# Patient Record
Sex: FEMALE | Race: BLACK OR AFRICAN AMERICAN | NOT HISPANIC OR LATINO | Employment: OTHER | URBAN - METROPOLITAN AREA
[De-identification: names, ages, dates, MRNs, and addresses within clinical notes are randomized per-mention and may not be internally consistent; named-entity substitution may affect disease eponyms.]

---

## 2021-02-13 ENCOUNTER — TRANSCRIBE ORDERS (OUTPATIENT)
Dept: SURGERY | Facility: SURGERY CENTER | Age: 36
End: 2021-02-13

## 2021-02-13 DIAGNOSIS — Z01.818 OTHER SPECIFIED PRE-OPERATIVE EXAMINATION: Primary | ICD-10-CM

## 2021-02-25 ENCOUNTER — TRANSCRIBE ORDERS (OUTPATIENT)
Dept: LAB | Facility: SURGERY CENTER | Age: 36
End: 2021-02-25

## 2021-02-25 DIAGNOSIS — Z01.818 OTHER SPECIFIED PRE-OPERATIVE EXAMINATION: Primary | ICD-10-CM

## 2021-05-25 RX ORDER — DIPHENHYDRAMINE HCL 25 MG
25 CAPSULE ORAL NIGHTLY PRN
COMMUNITY

## 2021-05-25 RX ORDER — METOPROLOL SUCCINATE 25 MG/1
25 TABLET, EXTENDED RELEASE ORAL DAILY
COMMUNITY

## 2021-05-29 ENCOUNTER — LAB (OUTPATIENT)
Dept: LAB | Facility: SURGERY CENTER | Age: 36
End: 2021-05-29

## 2021-05-29 DIAGNOSIS — Z01.818 OTHER SPECIFIED PRE-OPERATIVE EXAMINATION: ICD-10-CM

## 2021-05-29 LAB — SARS-COV-2 ORF1AB RESP QL NAA+PROBE: NOT DETECTED

## 2021-05-29 PROCEDURE — C9803 HOPD COVID-19 SPEC COLLECT: HCPCS

## 2021-05-29 PROCEDURE — U0004 COV-19 TEST NON-CDC HGH THRU: HCPCS | Performed by: SURGERY

## 2021-06-01 ENCOUNTER — HOSPITAL ENCOUNTER (OUTPATIENT)
Facility: SURGERY CENTER | Age: 36
Setting detail: HOSPITAL OUTPATIENT SURGERY
Discharge: HOME | End: 2021-06-01
Attending: OTOLARYNGOLOGY | Admitting: OTOLARYNGOLOGY

## 2021-06-01 ENCOUNTER — ANESTHESIA (OUTPATIENT)
Dept: SURGERY | Facility: SURGERY CENTER | Age: 36
End: 2021-06-01

## 2021-06-01 ENCOUNTER — ANESTHESIA EVENT (OUTPATIENT)
Dept: SURGERY | Facility: SURGERY CENTER | Age: 36
End: 2021-06-01

## 2021-06-01 VITALS
WEIGHT: 176.4 LBS | TEMPERATURE: 97.3 F | HEART RATE: 78 BPM | RESPIRATION RATE: 16 BRPM | BODY MASS INDEX: 31.25 KG/M2 | OXYGEN SATURATION: 100 % | SYSTOLIC BLOOD PRESSURE: 119 MMHG | HEIGHT: 63 IN | DIASTOLIC BLOOD PRESSURE: 66 MMHG

## 2021-06-01 DIAGNOSIS — J35.01 CHRONIC TONSILLITIS: Primary | ICD-10-CM

## 2021-06-01 LAB — GLUCOSE BLDC GLUCOMTR-MCNC: 124 MG/DL (ref 70–130)

## 2021-06-01 PROCEDURE — 25010000002 MIDAZOLAM PER 1MG: Performed by: ANESTHESIOLOGY

## 2021-06-01 PROCEDURE — 0C5PXZZ DESTRUCTION OF TONSILS, EXTERNAL APPROACH: ICD-10-PCS | Performed by: OTOLARYNGOLOGY

## 2021-06-01 PROCEDURE — 82962 GLUCOSE BLOOD TEST: CPT | Performed by: OTOLARYNGOLOGY

## 2021-06-01 PROCEDURE — 88304 TISSUE EXAM BY PATHOLOGIST: CPT | Performed by: OTOLARYNGOLOGY

## 2021-06-01 PROCEDURE — 0C5QXZZ DESTRUCTION OF ADENOIDS, EXTERNAL APPROACH: ICD-10-PCS | Performed by: OTOLARYNGOLOGY

## 2021-06-01 PROCEDURE — 25010000002 FENTANYL CITRATE (PF) 250 MCG/5ML SOLUTION: Performed by: ANESTHESIOLOGY

## 2021-06-01 PROCEDURE — 25010000002 SUCCINYLCHOLINE PER 20 MG: Performed by: ANESTHESIOLOGY

## 2021-06-01 PROCEDURE — 25010000002 DEXAMETHASONE PER 1 MG: Performed by: ANESTHESIOLOGY

## 2021-06-01 PROCEDURE — 42821 REMOVE TONSILS AND ADENOIDS: CPT | Performed by: OTOLARYNGOLOGY

## 2021-06-01 PROCEDURE — 25010000002 ONDANSETRON PER 1 MG: Performed by: ANESTHESIOLOGY

## 2021-06-01 PROCEDURE — 25010000002 PROPOFOL 10 MG/ML EMULSION: Performed by: ANESTHESIOLOGY

## 2021-06-01 PROCEDURE — 25010000002 FENTANYL CITRATE (PF) 50 MCG/ML SOLUTION: Performed by: ANESTHESIOLOGY

## 2021-06-01 RX ORDER — MIDAZOLAM HYDROCHLORIDE 1 MG/ML
1 INJECTION INTRAMUSCULAR; INTRAVENOUS
Status: DISCONTINUED | OUTPATIENT
Start: 2021-06-01 | End: 2021-06-01 | Stop reason: HOSPADM

## 2021-06-01 RX ORDER — HYDROMORPHONE HYDROCHLORIDE 1 MG/ML
0.5 INJECTION, SOLUTION INTRAMUSCULAR; INTRAVENOUS; SUBCUTANEOUS
Status: DISCONTINUED | OUTPATIENT
Start: 2021-06-01 | End: 2021-06-01 | Stop reason: HOSPADM

## 2021-06-01 RX ORDER — SODIUM CHLORIDE, SODIUM LACTATE, POTASSIUM CHLORIDE, CALCIUM CHLORIDE 600; 310; 30; 20 MG/100ML; MG/100ML; MG/100ML; MG/100ML
INJECTION, SOLUTION INTRAVENOUS CONTINUOUS PRN
Status: DISCONTINUED | OUTPATIENT
Start: 2021-06-01 | End: 2021-06-01 | Stop reason: SURG

## 2021-06-01 RX ORDER — SUCCINYLCHOLINE CHLORIDE 20 MG/ML
INJECTION INTRAMUSCULAR; INTRAVENOUS AS NEEDED
Status: DISCONTINUED | OUTPATIENT
Start: 2021-06-01 | End: 2021-06-01 | Stop reason: SURG

## 2021-06-01 RX ORDER — PROPOFOL 10 MG/ML
VIAL (ML) INTRAVENOUS AS NEEDED
Status: DISCONTINUED | OUTPATIENT
Start: 2021-06-01 | End: 2021-06-01 | Stop reason: SURG

## 2021-06-01 RX ORDER — FLUMAZENIL 0.1 MG/ML
0.2 INJECTION INTRAVENOUS AS NEEDED
Status: DISCONTINUED | OUTPATIENT
Start: 2021-06-01 | End: 2021-06-01 | Stop reason: HOSPADM

## 2021-06-01 RX ORDER — SODIUM CHLORIDE, SODIUM LACTATE, POTASSIUM CHLORIDE, CALCIUM CHLORIDE 600; 310; 30; 20 MG/100ML; MG/100ML; MG/100ML; MG/100ML
9 INJECTION, SOLUTION INTRAVENOUS CONTINUOUS
Status: DISCONTINUED | OUTPATIENT
Start: 2021-06-01 | End: 2021-06-01 | Stop reason: HOSPADM

## 2021-06-01 RX ORDER — DEXAMETHASONE SODIUM PHOSPHATE 4 MG/ML
INJECTION, SOLUTION INTRA-ARTICULAR; INTRALESIONAL; INTRAMUSCULAR; INTRAVENOUS; SOFT TISSUE AS NEEDED
Status: DISCONTINUED | OUTPATIENT
Start: 2021-06-01 | End: 2021-06-01 | Stop reason: SURG

## 2021-06-01 RX ORDER — DIPHENHYDRAMINE HCL 25 MG
25 TABLET ORAL
Status: DISCONTINUED | OUTPATIENT
Start: 2021-06-01 | End: 2021-06-01 | Stop reason: HOSPADM

## 2021-06-01 RX ORDER — FENTANYL CITRATE 50 UG/ML
INJECTION, SOLUTION INTRAMUSCULAR; INTRAVENOUS AS NEEDED
Status: DISCONTINUED | OUTPATIENT
Start: 2021-06-01 | End: 2021-06-01 | Stop reason: SURG

## 2021-06-01 RX ORDER — MAGNESIUM HYDROXIDE 1200 MG/15ML
LIQUID ORAL AS NEEDED
Status: DISCONTINUED | OUTPATIENT
Start: 2021-06-01 | End: 2021-06-01 | Stop reason: HOSPADM

## 2021-06-01 RX ORDER — SCOLOPAMINE TRANSDERMAL SYSTEM 1 MG/1
1 PATCH, EXTENDED RELEASE TRANSDERMAL
Status: DISCONTINUED | OUTPATIENT
Start: 2021-06-01 | End: 2021-06-01 | Stop reason: HOSPADM

## 2021-06-01 RX ORDER — NALOXONE HCL 0.4 MG/ML
0.2 VIAL (ML) INJECTION AS NEEDED
Status: DISCONTINUED | OUTPATIENT
Start: 2021-06-01 | End: 2021-06-01 | Stop reason: HOSPADM

## 2021-06-01 RX ORDER — BUPIVACAINE HYDROCHLORIDE 2.5 MG/ML
INJECTION, SOLUTION INFILTRATION; PERINEURAL AS NEEDED
Status: DISCONTINUED | OUTPATIENT
Start: 2021-06-01 | End: 2021-06-01 | Stop reason: HOSPADM

## 2021-06-01 RX ORDER — SODIUM CHLORIDE, SODIUM LACTATE, POTASSIUM CHLORIDE, CALCIUM CHLORIDE 600; 310; 30; 20 MG/100ML; MG/100ML; MG/100ML; MG/100ML
1000 INJECTION, SOLUTION INTRAVENOUS CONTINUOUS
Status: DISCONTINUED | OUTPATIENT
Start: 2021-06-01 | End: 2021-06-01 | Stop reason: HOSPADM

## 2021-06-01 RX ORDER — FENTANYL CITRATE 50 UG/ML
50 INJECTION, SOLUTION INTRAMUSCULAR; INTRAVENOUS
Status: DISCONTINUED | OUTPATIENT
Start: 2021-06-01 | End: 2021-06-01 | Stop reason: HOSPADM

## 2021-06-01 RX ORDER — BISMUTH SUBGALLATE
POWDER (GRAM) MISCELLANEOUS AS NEEDED
Status: DISCONTINUED | OUTPATIENT
Start: 2021-06-01 | End: 2021-06-01 | Stop reason: HOSPADM

## 2021-06-01 RX ORDER — IBUPROFEN 200 MG
600 TABLET ORAL ONCE AS NEEDED
Status: DISCONTINUED | OUTPATIENT
Start: 2021-06-01 | End: 2021-06-01 | Stop reason: HOSPADM

## 2021-06-01 RX ORDER — ONDANSETRON 2 MG/ML
INJECTION INTRAMUSCULAR; INTRAVENOUS AS NEEDED
Status: DISCONTINUED | OUTPATIENT
Start: 2021-06-01 | End: 2021-06-01 | Stop reason: SURG

## 2021-06-01 RX ORDER — LIDOCAINE HYDROCHLORIDE 10 MG/ML
0.5 INJECTION, SOLUTION INFILTRATION; PERINEURAL ONCE AS NEEDED
Status: DISCONTINUED | OUTPATIENT
Start: 2021-06-01 | End: 2021-06-01 | Stop reason: HOSPADM

## 2021-06-01 RX ORDER — DIPHENHYDRAMINE HYDROCHLORIDE 50 MG/ML
12.5 INJECTION INTRAMUSCULAR; INTRAVENOUS
Status: DISCONTINUED | OUTPATIENT
Start: 2021-06-01 | End: 2021-06-01 | Stop reason: HOSPADM

## 2021-06-01 RX ORDER — PROMETHAZINE HYDROCHLORIDE 25 MG/1
25 TABLET ORAL EVERY 6 HOURS PRN
Qty: 15 TABLET | Refills: 0 | Status: SHIPPED | OUTPATIENT
Start: 2021-06-01

## 2021-06-01 RX ADMIN — MIDAZOLAM HYDROCHLORIDE 1 MG: 2 INJECTION, SOLUTION INTRAMUSCULAR; INTRAVENOUS at 09:24

## 2021-06-01 RX ADMIN — SCOPALAMINE 1 PATCH: 1 PATCH, EXTENDED RELEASE TRANSDERMAL at 09:24

## 2021-06-01 RX ADMIN — PROPOFOL 160 MG: 10 INJECTION, EMULSION INTRAVENOUS at 09:37

## 2021-06-01 RX ADMIN — SODIUM CHLORIDE, POTASSIUM CHLORIDE, SODIUM LACTATE AND CALCIUM CHLORIDE 500 ML: 600; 310; 30; 20 INJECTION, SOLUTION INTRAVENOUS at 10:34

## 2021-06-01 RX ADMIN — FENTANYL CITRATE 50 MCG: 0.05 INJECTION, SOLUTION INTRAMUSCULAR; INTRAVENOUS at 10:46

## 2021-06-01 RX ADMIN — SODIUM CHLORIDE, POTASSIUM CHLORIDE, SODIUM LACTATE AND CALCIUM CHLORIDE 1000 ML: 600; 310; 30; 20 INJECTION, SOLUTION INTRAVENOUS at 08:45

## 2021-06-01 RX ADMIN — FENTANYL CITRATE 50 MCG: 50 INJECTION, SOLUTION INTRAMUSCULAR; INTRAVENOUS at 10:04

## 2021-06-01 RX ADMIN — SUCCINYLCHOLINE CHLORIDE 180 MG: 20 INJECTION, SOLUTION INTRAMUSCULAR; INTRAVENOUS at 09:37

## 2021-06-01 RX ADMIN — ONDANSETRON 4 MG: 2 INJECTION INTRAMUSCULAR; INTRAVENOUS at 10:04

## 2021-06-01 RX ADMIN — FENTANYL CITRATE 50 MCG: 0.05 INJECTION, SOLUTION INTRAMUSCULAR; INTRAVENOUS at 10:32

## 2021-06-01 RX ADMIN — SODIUM CHLORIDE, SODIUM LACTATE, POTASSIUM CHLORIDE, AND CALCIUM CHLORIDE: .6; .31; .03; .02 INJECTION, SOLUTION INTRAVENOUS at 09:46

## 2021-06-01 RX ADMIN — DEXAMETHASONE SODIUM PHOSPHATE 10 MG: 4 INJECTION, SOLUTION INTRAMUSCULAR; INTRAVENOUS at 09:46

## 2021-06-01 RX ADMIN — FAMOTIDINE 20 MG: 10 INJECTION INTRAVENOUS at 09:24

## 2021-06-01 RX ADMIN — FENTANYL CITRATE 100 MCG: 50 INJECTION, SOLUTION INTRAMUSCULAR; INTRAVENOUS at 09:37

## 2021-06-01 RX ADMIN — FENTANYL CITRATE 50 MCG: 50 INJECTION, SOLUTION INTRAMUSCULAR; INTRAVENOUS at 09:46

## 2021-06-01 NOTE — ANESTHESIA POSTPROCEDURE EVALUATION
"Patient: Meghann Stewart    Procedure Summary     Date: 06/01/21 Room / Location: [facility]    Anesthesia Start: 0928 Anesthesia Stop: 1027    Procedures:       TONSILLECTOMY (N/A Throat)      ADENOIDECTOMY (N/A Throat) Diagnosis:       Chronic tonsillitis      (Chronic tonsillitis [J35.01])    Surgeons: Sebastian Bustos MD Provider: Mikie Onofre MD    Anesthesia Type: general ASA Status: 2          Anesthesia Type: general    Vitals  Vitals Value Taken Time   /70 06/01/21 1036   Temp 36.8 °C (98.3 °F) 06/01/21 1023   Pulse 88 06/01/21 1036   Resp 16 06/01/21 1036   SpO2 99 % 06/01/21 1036           Post Anesthesia Care and Evaluation    Patient location during evaluation: bedside  Patient participation: complete - patient participated  Level of consciousness: awake and alert  Pain management: adequate  Airway patency: patent  Anesthetic complications: No anesthetic complications    Cardiovascular status: acceptable  Respiratory status: acceptable  Hydration status: acceptable    Comments: /70 (BP Location: Left arm, Patient Position: Lying)   Pulse 88   Temp 36.8 °C (98.3 °F) (Temporal)   Resp 16   Ht 160 cm (63\")   Wt 80 kg (176 lb 6.4 oz)   SpO2 99%   BMI 31.25 kg/m²       "

## 2021-06-01 NOTE — OP NOTE
TONSILLECTOMY, ADENOIDECTOMY  Progress Note    Meghann Stewart  6/1/2021    Pre-op Diagnosis:   Chronic tonsillitis [J35.01]       Post-Op Diagnosis Codes:     * Chronic tonsillitis [J35.01]    Procedure/CPT® Codes:        Procedure(s):  TONSILLECTOMY & ADENOIDECTOMY    Surgeon(s):  Sebastian Bustos MD    Anesthesia: General    Staff:   Circulator: Marielos Esparza RN; Jolynn Nick RN  Scrub Person: Oly Zahra Libbyearlene         Estimated Blood Loss: 75 ml    Urine Voided: * No values recorded between 6/1/2021  9:27 AM and 6/1/2021 10:10 AM *    Specimens:                Specimens     ID Source Type Tests Collected By Collected At Frozen?    A Tonsil, Right Tissue · TISSUE PATHOLOGY EXAM   Sebastian Bustos MD 6/1/21 3977     Description: Right tonsil    Comment: Right tonsil    B Tonsil, Left Tissue · TISSUE PATHOLOGY EXAM   Sebastian Bustos MD 6/1/21 3337     Description: Left Tonsil    Comment: Left Tonsil                Drains: * No LDAs found *    Findings: 3+ cryptic tonsil with retained debris. 1+ adenoids    Complications: none    OPERATIVE REPORT: The patient was taken to the operating room, placed in the supine position and placed under general endotracheal anesthesia.  The bed was rotated 90° and patient was prepped and draped in routine fashion for tonsillectomy and adenoidectomy.  The McIvor mouth gag was inserted and placed onto suspension.  Red rubber catheters were passed transnasally to place tension onto the soft palate.  The adenoids were indirectly visualized and addressed using the suction electrocautery.  Attention was then turned to the tonsils where first the right then the left were dissected sharply in a subcapsular fashion.  A 12 blade scalpel was used to incise the posterior and anterior pillars and to take down the mucosal attachments superiorly.  The tonsils were then shelled from the tonsillar fossa using the Aviles knife.  Final removal was accomplished using the tonsil  snare.  Bismuth coated cottonoids were placed in the tonsillar fossa to aid in hemostasis.  The mouthgag was then let down for 60 seconds.  Upon resuspension, final hemostasis in the tonsillar beds as well as the adenoid bed was achieved using the suction electrocautery.  Copious irrigation of the operative site was performed revealing excellent hemostasis.  At this point, 0.25% Marcaine was then injected into the anterior tonsillar pillar and posterior tonsillar pillar.  At this point, the procedure was complete.  The patient was allowed to awaken from anesthesia and taken to the recovery room in satisfactory condition.            Sebastian Bustos MD     Date: 6/1/2021  Time: 10:12 EDT

## 2021-06-01 NOTE — DISCHARGE INSTRUCTIONS
"Dr Zapata/Dr Bustos    Tonsillectomy Discharge Instructions        Diet :  1st DAY FOLLOWING SURGERY    Nothing Red and Do not drink through a straw for 2 weeks      - Adequate fluid intake is essential to prevent dehydration.      - Although swallowing may be initially painful, patient needs to encouraged to drink an ample volume of fluids such as:                           Kaushal-Aid      Popsicles    Soft Drinks    Jello   Tea   Soups   Ice Cream  Milkshakes         *** A mild elevation of temperature following surgery usually means inadequate fluid intake.  Avoid citrus juices      2nd DAY AFTER SURGERY:       - Gradually add soft easy to chew food such as:                                         Soft cereals ( cream of wheat, etc,)   Scrambled eggs  Chopped Meats  Rice  Macaroni   Mashed Potatoes                            Avoid \"highly\" seasoned foods which require a great deal of chewing before swallowing      5th - 7th DAYS AFTER SURGERY                                      Gradually resume regular diet.            ACTIVITIES:                 Although bed rest is not required, quiet activities are recommended.  For the 1st two weeks following tonsillectomy.  Strenuous physical exercise ( sports, or heavy work) is discouraged     since since tonsillar hemorrhage may result.  If bleeding occurs, most likely it will be 7-10 days after surgery.        SCHOOL:               School may be resumed in 5-10 days following surgery, if patient is doing well.  GYM may be resumed in 2 weeks.      WORK :                Generally 5-10 days following surgery.  However, if physical labor is required, work may be resumed in 2 weeks,      PAIN:             Sore throat, sometimes considerable, is to be expected following tonsillectomy.  Drink adequate amounts of fluids each days will lesson the painful swallowing.                 Frequently, ear ache is experienced following tonsillectomy.  This ear ache is not an ear infection, " but originates from the sore throat.  The ear ache  may be treated with a heating pad or hot water bottle applied to the ear and with pain medication.  Chewing gum for the first 3-5 days after surgery with help reduce the sore throat and ear ache.          BLEEDING:            Please call my office for ANY bleeding from the mouth or nose or vomiting of blood.        FOLLOW-UP APPOINTMENT:                   Please call the office to schedule a follow up appointment following surgery at [phone].        OTHER INSTRUCTIONS AND INFORMATION:      1. Gargling is not necessary.      2. Brushing teeth may be resumed immediately.      3  Following surgery, a whitish-gray shaggy scab covers the tonsillar area until healing.       4. Aspirin or aspirin-containing products should not be used for 2 weeks before and after tonsillectomy.      5. Please call my office with repeated vomiting, persistant cough, and temperature elevated above 101 degrees.         -

## 2021-06-01 NOTE — ANESTHESIA PROCEDURE NOTES
Airway  Urgency: elective    Date/Time: 6/1/2021 9:37 AM    General Information and Staff    Patient location during procedure: OR  Anesthesiologist: Mikie Onofre MD    Indications and Patient Condition  Indications for airway management: airway protection    Preoxygenated: yes  MILS maintained throughout  Mask difficulty assessment: 1 - vent by mask    Final Airway Details  Final airway type: endotracheal airway      Successful airway: ETT  Cuffed: yes   Successful intubation technique: direct laryngoscopy  Endotracheal tube insertion site: oral  Blade: Willy  Blade size: 4  ETT size (mm): 6.5  Cormack-Lehane Classification: grade IIa - partial view of glottis  Placement verified by: chest auscultation   Number of attempts at approach: 1  Assessment: lips, teeth, and gum same as pre-op and atraumatic intubation

## 2021-06-01 NOTE — ANESTHESIA PREPROCEDURE EVALUATION
Anesthesia Evaluation     Patient summary reviewed and Nursing notes reviewed   history of anesthetic complications: PONV  NPO Solid Status: > 8 hours  NPO Liquid Status: > 2 hours           Airway   Mallampati: II  TM distance: >3 FB  Neck ROM: full  no difficulty expected  Dental - normal exam     Pulmonary - negative pulmonary ROS and normal exam   (-) decreased breath sounds, wheezes  Cardiovascular - normal exam  Exercise tolerance: good (4-7 METS)    (-) hypertension      Neuro/Psych- negative ROS  (-) seizures, CVA  GI/Hepatic/Renal/Endo    (+)   diabetes mellitus,     Musculoskeletal (-) negative ROS    Abdominal  - normal exam   Substance History - negative use  (-) alcohol use, drug use     OB/GYN negative ob/gyn ROS         Other - negative ROS                     Anesthesia Plan    ASA 2     general     intravenous induction     Anesthetic plan, all risks, benefits, and alternatives have been provided, discussed and informed consent has been obtained with: patient.

## 2021-06-02 LAB
LAB AP CASE REPORT: NORMAL
LAB AP CLINICAL INFORMATION: NORMAL
PATH REPORT.FINAL DX SPEC: NORMAL
PATH REPORT.GROSS SPEC: NORMAL

## 2022-05-23 ENCOUNTER — OFFICE VISIT (OUTPATIENT)
Dept: ORTHOPEDIC SURGERY | Facility: CLINIC | Age: 37
End: 2022-05-23

## 2022-05-23 VITALS — TEMPERATURE: 98 F | BODY MASS INDEX: 31.18 KG/M2 | WEIGHT: 176 LBS | HEIGHT: 63 IN

## 2022-05-23 DIAGNOSIS — M76.899 QUADRICEPS TENDONITIS: Primary | ICD-10-CM

## 2022-05-23 PROCEDURE — 99243 OFF/OP CNSLTJ NEW/EST LOW 30: CPT | Performed by: ORTHOPAEDIC SURGERY

## 2022-05-23 PROCEDURE — 73562 X-RAY EXAM OF KNEE 3: CPT | Performed by: ORTHOPAEDIC SURGERY

## 2022-05-23 RX ORDER — METRONIDAZOLE 500 MG/1
500 TABLET ORAL
COMMUNITY
Start: 2022-05-20 | End: 2022-05-27

## 2022-05-23 RX ORDER — CLOTRIMAZOLE 1 %
CREAM (GRAM) TOPICAL
COMMUNITY
Start: 2022-05-11

## 2022-05-23 RX ORDER — FLUCONAZOLE 150 MG/1
TABLET ORAL
COMMUNITY
Start: 2022-03-18

## 2022-05-23 NOTE — PROGRESS NOTES
"Patient Name: Meghann Stewart   YOB: 1985  Referring Primary Care Physician: Provider, No Known  BMI: Body mass index is 31.18 kg/m².    Chief Complaint:    Chief Complaint   Patient presents with   • Right Knee - Pain        HPI:     Meghann Stewart is a 36 y.o. female who presents today for evaluation of   Chief Complaint   Patient presents with   • Right Knee - Pain   .  Patient is seen today complaint of bilateral knee pain more on the right than the left.  It hurts at the superior portion of her patella.  She denies any injuries.  It was feeling worse when she was wearing her heels.  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  Been going on for quite a while and it feels like \"the tendons are on fire\".  She was given Voltaren gel that seem to help.  Says on her chart she is on hydrocodone but she denies this.  Is an old prescription denies any locking or catching in her knees it does hurt her when she goes up or down stairs or squats      Subjective   Medications:   Home Medications:  Current Outpatient Medications on File Prior to Visit   Medication Sig   • clotrimazole (LOTRIMIN) 1 % cream    • Diclofenac Sodium (VOLTAREN) 1 % gel gel Apply 2 g topically to the appropriate area as directed.   • diphenhydrAMINE (BENADRYL) 25 mg capsule Take 25 mg by mouth At Night As Needed for Itching.   • fluconazole (DIFLUCAN) 150 MG tablet    • metFORMIN (GLUCOPHAGE) 500 MG tablet Take 500 mg by mouth Daily With Breakfast.   • metoprolol succinate XL (TOPROL-XL) 25 MG 24 hr tablet Take 25 mg by mouth Daily.   • metroNIDAZOLE (FLAGYL) 500 MG tablet Take 500 mg by mouth.   • promethazine (PHENERGAN) 25 MG tablet Take 1 tablet by mouth Every 6 (Six) Hours As Needed for Nausea or Vomiting.   • HYDROcodone-acetaminophen (HYCET) 7.5-325 MG/15ML solution Take 15 mL by mouth Every 6 (Six) Hours As Needed for Moderate Pain .     No current facility-administered medications on file prior to " visit.     Current Medications:  Scheduled Meds:  Continuous Infusions:No current facility-administered medications for this visit.    PRN Meds:.    I have reviewed the patient's medical history in detail and updated the computerized patient record.  Review and summarization of old records includes:    Past Medical History:   Diagnosis Date   • Diabetes mellitus (HCC)    • Migraines    • PONV (postoperative nausea and vomiting)         Past Surgical History:   Procedure Laterality Date   • ADENOIDECTOMY N/A 6/1/2021    Procedure: ADENOIDECTOMY;  Surgeon: Sebastian Bustos MD;  [place] [facility] [place]  Service: E[place];  Laterality: N/A;   • CHOLECYSTECTOMY     • HYSTERECTOMY     • TONSILLECTOMY N/A 6/1/2021    Procedure: TONSILLECTOMY;  Surgeon: Sebastain Bustos MD;  [place] [facility] [place];  Service: ENT;  Laterality: N/A;        Social History     Occupational History   • Not on file   Tobacco Use   • Smoking status: Never Smoker   • Smokeless tobacco: Not on file   Vaping Use   • Vaping Use: Never used   Substance and Sexual Activity   • Alcohol use: Yes     Comment: socially   • Drug use: Never   • Sexual activity: Defer      Social History     Social History Narrative   • Not on file      No family history on file.    ROS: 14 point review of systems was performed and all other systems were reviewed and are negative except for documented findings in HPI and today's encounter.     Allergies:   Allergies   Allergen Reactions   • Codeine Other (See Comments)     Migraines   • Oxycodone-Acetaminophen Other (See Comments)     Migraines   • Morphine Headache     Constitutional:  Denies fever, shaking or chills   Eyes:  Denies change in visual acuity   HENT:  Denies nasal congestion or sore throat   Respiratory:  Denies cough or shortness of breath   Cardiovascular:  Denies chest pain or severe LE edema   GI:  Denies abdominal pain, nausea, vomiting, bloody stools or diarrhea   Musculoskeletal:  Numbness, tingling,  "pain, or loss of motor function only as noted above in history of present illness.  : Denies painful urination or hematuria  Integument:  Denies rash, lesion or ulceration   Neurologic:  Denies headache or focal weakness  Endocrine:  Denies lymphadenopathy  Psych:  Denies confusion or change in mental status   Hem:  Denies active bleeding    OBJECTIVE:  Physical Exam: 36 y.o. female  Wt Readings from Last 3 Encounters:   05/23/22 79.8 kg (176 lb)   06/01/21 80 kg (176 lb 6.4 oz)     Ht Readings from Last 1 Encounters:   05/23/22 160 cm (62.99\")     Body mass index is 31.18 kg/m².  Vitals:    05/23/22 0950   Temp: 98 °F (36.7 °C)     Vital signs reviewed.     General Appearance:    Alert, cooperative, in no acute distress                  Eyes: conjunctiva clear  ENT: external ears and nose atraumatic  CV: no peripheral edema  Resp: normal respiratory effort  Skin: no rashes or wounds; normal turgor  Psych: mood and affect appropriate  Lymph: no nodes appreciated  Neuro: gross sensation intact  Vascular:  Palpable peripheral pulse in noted extremity  Musculoskeletal Extremities: Exam today shows pleasant woman she has good range of motion of her knees Merry's is negative she has good ligamentous exam she is tender at superior portion of her patella right at the insertion of the quadriceps tendon but I feel no defects there is no history of trauma    Radiology:   AP lateral 40 degree PA x-ray right knee taken the office today for complaints of pain without comparison views available show mild arthritic change.        Assessment:     ICD-10-CM ICD-9-CM   1. Quadriceps tendonitis  M76.899 727.09        MDM/Plan:   The diagnosis(es), natural history, pathophysiology and treatment for diagnosis(es) were discussed. Opportunity given and questions answered.  Biomechanics of pertinent body areas discussed.  When appropriate, the use of ambulatory aids discussed.    MEDICATIONS:  The risks, benefits, warnings,side " effects and alternatives of medications discussed.  Inflammation/pain control; with cold, heat, elevation and/or liniments discussed as appropriate  PT referral.  Believe she has quadriceps tendinitis and explained to her lets have her try the above if she does not get better she should see the nonoperative sports medicine doctors    5/23/2022    Dictated utilizing Dragon dictation

## 2022-06-21 ENCOUNTER — HOSPITAL ENCOUNTER (OUTPATIENT)
Dept: PHYSICAL THERAPY | Facility: HOSPITAL | Age: 37
Setting detail: THERAPIES SERIES
Discharge: HOME | End: 2022-06-21

## 2022-06-21 DIAGNOSIS — M25.661 DECREASED RANGE OF MOTION OF BOTH KNEES: ICD-10-CM

## 2022-06-21 DIAGNOSIS — M76.899 QUADRICEPS TENDONITIS: Primary | ICD-10-CM

## 2022-06-21 DIAGNOSIS — M25.662 DECREASED RANGE OF MOTION OF BOTH KNEES: ICD-10-CM

## 2022-06-21 DIAGNOSIS — M25.561 ACUTE PAIN OF BOTH KNEES: ICD-10-CM

## 2022-06-21 DIAGNOSIS — M25.562 ACUTE PAIN OF BOTH KNEES: ICD-10-CM

## 2022-06-21 DIAGNOSIS — R26.9 GAIT DIFFICULTY: ICD-10-CM

## 2022-06-21 DIAGNOSIS — M62.81 MUSCLE WEAKNESS OF LOWER EXTREMITY: ICD-10-CM

## 2022-06-21 PROCEDURE — 97161 PT EVAL LOW COMPLEX 20 MIN: CPT

## 2022-06-21 PROCEDURE — 97110 THERAPEUTIC EXERCISES: CPT

## 2022-06-21 NOTE — THERAPY EVALUATION
Outpatient Physical Therapy Ortho Initial Evaluation  [facility]     Patient Name: Meghann Stewart  : 1985  MRN: [id number]  Today's Date: 2022      Visit Date: 2022    There is no problem list on file for this patient.       Past Medical History:   Diagnosis Date   • Diabetes mellitus (HCC)    • Migraines    • PONV (postoperative nausea and vomiting)         Past Surgical History:   Procedure Laterality Date   • ADENOIDECTOMY N/A 2021    Procedure: ADENOIDECTOMY;  Surgeon: Sebastian Bustos MD;  Location: [facility] [place]IN OR;  Service: ENT;  Laterality: N/A;   • CHOLECYSTECTOMY     • HYSTERECTOMY     • TONSILLECTOMY N/A 2021    Procedure: TONSILLECTOMY;  Surgeon: Sebastian Bustos MD;  Location: [facility] [place] MAIN OR;  Service: ENT;  Laterality: N/A;       Visit Dx:     ICD-10-CM ICD-9-CM   1. Quadriceps tendonitis  M76.899 727.09   2. Acute pain of both knees  M25.561 338.19    M25.562 719.46   3. Decreased range of motion of both knees  M25.661 719.56    M25.662    4. Muscle weakness of lower extremity  M62.81 728.87   5. Gait difficulty  R26.9 781.2          Patient History     Row Name 22 0800             History    Chief Complaint Pain  -JA      Type of Pain Knee pain  brooks  -JA      Date Current Problem(s) Began --  end of April.  -JA      Brief Description of Current Complaint Began having knee pain near the end of April, felt pain like the top of her knees were burining. One day she stood up and went down.  States that the last week in April it was so bad she couldn't walk, knees felt like they were on fire.  Went to specialist.  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 Stairs are difficult and states she can handle 3 or less, has 2 to get into home. 2 weeks prior to the pain she took a trip to [place] and walked a lot, she did have some pain one day but she pushed herself to  keep going.  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 [facility]  -JA              Pain     Pain Location Knee  brooks, most days R>L  -JA      Pain at Present 7  -JA      Pain at Best 7  -JA      Pain at Worst 9  -JA      Pain Comments even with Voltaren cream  -JA              Fall Risk Assessment    Any falls in the past year: Yes  -JA      Number of falls reported in the last 12 months 1  -JA      Factors that contributed to the fall: --  pain caused her to go down when she first stood up getting out of bed  -JA              Daily Activities    Recommended Referrals Physical Therapy  -JA      Pt Participated in POC and Goals Yes  -JA            User Key  (r) = Recorded By, (t) = Taken By, (c) = Cosigned By    Initials Name Provider Type    Pooja Haywood, PT Physical Therapist                 PT Ortho     Row Name 06/21/22 0800       Posture/Observations    Forward Head Increased  -JA    Cervical Lordosis Decreased  -JA    Thoracic Kyphosis Increased  -JA    Rounded Shoulders Increased  -JA    Lumbar lordosis Increased  -JA    Genu valgus Increased;Left:;Mild;Right:;Normal  -JA    Posture/Observations Comments stands with wt shift L and brooks knees flexed slightly due to pain  -JA       Special Tests/Palpation    Special Tests/Palpation Knee  -JA       Knee Palpation    Patella Left:;Tender  -JA    Suprapatella Bursa Left:;Tender  -JA    Quads Left:;Atrophied  tender at sup patella  -JA       General ROM    RT Lower Ext Rt Knee Extension/Flexion;Rt Ankle Dorsiflexion;Rt Ankle Plantarflexion  -JA    LT Lower Ext Lt Knee Extension/Flexion;Lt Ankle Dorsiflexion;Lt Ankle Plantarflexion  -       Right Lower Ext    Rt Knee Extension/Flexion AROM 5-126  -JA    Rt Knee Extension/Flexion PROM 0-130  -JA    Rt Ankle Dorsiflexion PROM WFL  -JA    Rt Ankle Plantarflexion AROM WNL  -JA    RT Lower Extremity Comments lacks IR ROM, hips rest in ER  -JA       Left  Lower Ext    Lt Knee Extension/Flexion AROM 3-120  -JA    Lt Knee Extension/Flexion PROM 0-125  -JA    Lt Ankle Dorsiflexion PROM WFL  -JA    Lt Ankle Plantarflexion AROM WNL  -JA    LT Lower Extremity Comments lacks IR ROM, hips rest in ER  -JA       MMT (Manual Muscle Testing)    Rt Lower Ext Rt Hip Flexion;Rt Hip Extension;Rt Hip ABduction;Rt Hip Internal (Medial) Rotation;Rt Hip External (Lateral) Rotation;Rt Ankle Plantarflexion;Rt Ankle Dorsiflexion;Rt Knee Extension;Rt Knee Flexion  -JA    Lt Lower Ext Lt Hip Flexion;Lt Hip Extension;Lt Hip ABduction;Lt Hip Internal (Medial) Rotation;Lt Hip External (Lateral) Rotation;Lt Knee Extension;Lt Knee Flexion;Lt Ankle Plantarflexion;Lt Ankle Dorsiflexion  -JA       MMT Right Lower Ext    Rt Hip Flexion MMT, Gross Movement (4/5) good  -JA    Rt Hip Extension MMT, Gross Movement (4-/5) good minus  -JA    Rt Hip ABduction MMT, Gross Movement (4-/5) good minus  -JA    Rt Hip Internal (Medial) Rotation MMT, Gross Movement (3+/5) fair plus  -JA    Rt Hip External (Lateral) Rotation MMT, Gross Movement (3+/5) fair plus  -JA    Rt Knee Extension MMT, Gross Movement (4-/5) good minus  -JA    Rt Knee Flexion MMT, Gross Movement (4-/5) good minus  -JA    Rt Ankle Dorsiflexion MMT, Gross Movement (4-/5) good minus  -JA       MMT Left Lower Ext    Lt Hip Flexion MMT, Gross Movement (4-/5) good minus  -JA    Lt Hip Extension MMT, Gross Movement (3+/5) fair plus  -JA    Lt Hip ABduction MMT, Gross Movement (3+/5) fair plus  -JA    Lt Hip Internal (Medial) Rotation MMT, Gross Movement (3+/5) fair plus  -JA    Lt Hip External (Lateral) Rotation MMT, Gross Movement (3+/5) fair plus  -JA    Lt Knee Extension MMT, Gross Movement (3+/5) fair plus  -JA    Lt Knee Flexion MMT, Gross Movement (3+/5) fair plus  -JA    Lt Ankle Dorsiflexion MMT, Gross Movement (3+/5) fair plus  -JA    Lt Lower Extremity Comments  painful quad tendon, superior patella and inferior border patella  -JA        Transfers    Comment, (Transfers) brooks UE assistance due to pain  -MARLYS       Gait/Stairs (Locomotion)    Comment, (Gait/Stairs) decreased yemi, asymmetrical stride, increased weight shift  -MARLYS          User Key  (r) = Recorded By, (t) = Taken By, (c) = Cosigned By    Initials Name Provider Type    Pooja Haywood, PT Physical Therapist                            Therapy Education  Education Details: CAMILO  Discussed importance of strength and how it relates to pain, and how pain can inhibite strength.  She will benefit from gait mechanics training and sit to stand work.  Given: HEP, Symptoms/condition management, Pain management  Program: New  How Provided: Verbal, Demonstration, Written  Provided to: Patient  Level of Understanding: Verbalized, Demonstrated      PT OP Goals     Row Name 06/21/22 1300          PT Short Term Goals    STG Date to Achieve 07/21/22  -MARLYS     STG 1 Patient will be independent with initial HEP.  -MARLYS     STG 1 Progress New  -MARLYS     STG 2 Pt will report decreased pain to 5/10 or less with gait and ADLs.  -MARLYS     STG 2 Progress New  -MARLYS     STG 3 Pt will demonstrate increased AROM brooks ext to 0, AROM L flexion 125 degrees or better.  -MARLYS            Long Term Goals    LTG Date to Achieve 08/20/22  -MARLYS     LTG 1 Pt will be independent with advanced HEP for core and LE strengthening to facilitate ease of ADLs and gait.  -MARLYS     LTG 1 Progress New  -MARLYS     LTG 2 Pt will demonstrate increased strength to 4/5-4+/5 or better  -MARLYS     LTG 2 Progress New  -MARLYS     LTG 3 Pt will score 40/80 or better indicating decrease in perceived functional disability.  -MARLYS     LTG 3 Progress New  -MARLYS     LTG 4 Pt will report decreased pain to 3/10 or less with ADLs and gait to facilitate return to PLOF.  -MARLYS     LTG 4 Progress New  -MARLYS            Time Calculation    PT Goal Re-Cert Due Date 09/19/22  -MARLYS           User Key  (r) = Recorded By, (t) = Taken By, (c) = Cosigned By    Initials Name Provider Type     Pooja Haywood, PT Physical Therapist                 PT Assessment/Plan     Row Name 06/21/22 1500          PT Assessment    Functional Limitations Impaired gait;Limitation in home management;Limitations in community activities;Limitations in functional capacity and performance;Performance in leisure activities;Performance in self-care ADL;Performance in work activities  -MARLYS     Impairments Pain;Muscle strength;Range of motion;Gait;Endurance;Posture;Poor body mechanics  -MARLYS     Assessment Comments Meghann Stewart is a 36 y.o. female referred to outpatient physical therapy for evaluation and treatment of bilateral knee pain secondary to quadriceps tendonitis.  Patient presents with c/o 7/10 pain with activity, observed mild valgus of L knee joint and mild atrophy of L quad compared to R, decreased brooks knee ROM, weakness in brooks LE's L>R, point tenderness over superior and inferior L patella and to a lesser degree R patellar region; difficulty with transitional movements and functional mobility. Signs and symptoms are consistent with referring diagnosis.  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  This condition is stable. Recommend skilled PT to address functional deficits. Thank you for this referral.  -MARLYS     Please refer to paper survey for additional self-reported information Yes  -MARLYS     Rehab Potential Good  -MARLYS     Patient/caregiver participated in establishment of treatment plan and goals Yes  -MARLYS     Patient would benefit from skilled therapy intervention Yes  -MARLYS            PT Plan    PT Frequency 2x/week  -MARLYS     Predicted Duration of Therapy Intervention (PT) 4 weeks  -MARLYS     Planned CPT's? PT EVAL LOW COMPLEXITY: 89502;PT RE-EVAL: 09360;PT THER PROC EA 15 MIN: 46413;PT THER ACT EA 15 MIN: 79707;PT MANUAL THERAPY EA 15 MIN: 01935;PT GAIT  TRAINING EA 15 MIN: 62245;PT SELF CARE/HOME MGMT/TRAIN EA 15: 96294;PT HOT OR COLD PACK TREAT MCARE  -JA     PT Plan Comments assess response to initial eval, review HEP, begin with nustep (later/future will use bike warm-up), progress reps as able; trialed gentle LAD that may be helpful with pain modulation  -JA           User Key  (r) = Recorded By, (t) = Taken By, (c) = Cosigned By    Initials Name Provider Type    Pooja Haywood, PT Physical Therapist                   OP Exercises     Row Name 06/21/22 1300             Subjective Comments    Subjective Comments initial eval  -JA              Total Minutes    16483 - PT Therapeutic Exercise Minutes 25  -JA              Exercise 1    Exercise Name 1 Discussed pain inhibition, compensatory substitution, decreased use=decreased strength.  -JA      Additional Comments begin nustep (plan to later have her use bike when pain level is decreased)  -JA              Exercise 2    Exercise Name 2 TA in HL  -JA      Cueing 2 Verbal;Tactile;Demo  -JA      Reps 2 10  -JA      Time 2 3 sec  -JA      Additional Comments perform TA with the other exercises  -JA              Exercise 3    Exercise Name 3 ankle pumps-primarily DF  -JA      Cueing 3 Verbal;Tactile;Demo  -JA      Reps 3 10  -JA              Exercise 4    Exercise Name 4 supine QS w/towel roll under knee  -JA      Cueing 4 Verbal;Tactile  -JA      Reps 4 10  -JA      Time 4 3sec  -JA              Exercise 5    Exercise Name 5 supine glute sets  -JA      Cueing 5 Verbal;Tactile  -JA      Reps 5 10  -JA      Time 5 3 sec  -JA              Exercise 6    Exercise Name 6 supine heel slides with TA  -JA      Cueing 6 Verbal;Tactile  -JA      Reps 6 10  -JA      Time 6 3 sec  -JA      Additional Comments plastic under foot to slide on mat table  -JA              Exercise 7    Exercise Name 7 SLR w/ER  -JA      Cueing 7 Verbal;Tactile  -JA      Reps 7 10  -JA              Exercise 8    Exercise Name 8 supine hip abd   -JA      Cueing 8 Verbal;Tactile;Demo  -JA      Reps 8 10  -JA      Additional Comments slide on plastic  -JA              Exercise 9    Exercise Name 9 LAQ  -JA      Cueing 9 Verbal;Demo  -JA      Sets 9 2  -JA      Reps 9 10  -JA              Exercise 10    Exercise Name 10 seated knee flexion  -JA      Cueing 10 Verbal;Demo  -JA      Reps 10 3  -JA      Time 10 20sec  -JA      Additional Comments slide heel under chair -don't have to use other foot to push knee  -JA            User Key  (r) = Recorded By, (t) = Taken By, (c) = Cosigned By    Initials Name Provider Type    Pooja Haywood, PT Physical Therapist                              Outcome Measure Options: Lower Extremity Functional Scale (LEFS)  Lower Extremity Functional Index  Any of your usual work, housework or school activities: Moderate difficulty  Your usual hobbies, recreational or sporting activities: Quite a bit of difficulty  Getting into or out of the bath: Moderate difficulty  Walking between rooms: A little bit of difficulty  Putting on your shoes or socks: A little bit of difficulty  Squatting: Extreme difficulty or unable to perform activity  Lifting an object, like a bag of groceries from the floor: Moderate difficulty  Performing light activities around your home: A little bit of difficulty  Performing heavy activities around your home: Extreme difficulty or unable to perform activity  Getting into or out of a car: A little bit of difficulty  Walking 2 blocks: Extreme difficulty or unable to perform activity  Walking a mile: Extreme difficulty or unable to perform activity  Going up or down 10 stairs (about 1 flight of stairs): Extreme difficulty or unable to perform activity  Standing for 1 hour: Quite a bit of difficulty  Sitting for 1 hour: Extreme difficulty or unable to perform activity  Running on even ground: Extreme difficulty or unable to perform activity  Running on uneven ground: Extreme difficulty or unable to perform  activity  Making sharp turns while running fast: Extreme difficulty or unable to perform activity  Hopping: Extreme difficulty or unable to perform activity  Rolling over in bed: A little bit of difficulty  Total: 23      Time Calculation:     Start Time: 0800  Stop Time: 0845  Time Calculation (min): 45 min  Timed Charges  11442 - PT Therapeutic Exercise Minutes: 25  Untimed Charges  PT Eval/Re-eval Minutes: 15  Total Minutes  Timed Charges Total Minutes: 25  Untimed Charges Total Minutes: 15   Total Minutes: 40     Therapy Charges for Today     Code Description Service Date Service Provider Modifiers Qty    12652448857 HC PT THER PROC EA 15 MIN 6/21/2022 Pooja Rodriguez, PT GP 2    33884838835 HC PT EVAL LOW COMPLEXITY 1 6/21/2022 Pooja Rodriguez, PT GP 1          PT G-Codes  Outcome Measure Options: Lower Extremity Functional Scale (LEFS)  Total: 23         Pooja Rodriguez PT  6/21/2022

## 2022-06-24 ENCOUNTER — HOSPITAL ENCOUNTER (OUTPATIENT)
Dept: PHYSICAL THERAPY | Facility: HOSPITAL | Age: 37
Setting detail: THERAPIES SERIES
Discharge: HOME | End: 2022-06-24

## 2022-06-24 DIAGNOSIS — M76.899 QUADRICEPS TENDONITIS: Primary | ICD-10-CM

## 2022-06-24 DIAGNOSIS — R26.9 GAIT DIFFICULTY: ICD-10-CM

## 2022-06-24 DIAGNOSIS — M25.662 DECREASED RANGE OF MOTION OF BOTH KNEES: ICD-10-CM

## 2022-06-24 DIAGNOSIS — M62.81 MUSCLE WEAKNESS OF LOWER EXTREMITY: ICD-10-CM

## 2022-06-24 DIAGNOSIS — M25.661 DECREASED RANGE OF MOTION OF BOTH KNEES: ICD-10-CM

## 2022-06-24 DIAGNOSIS — M25.561 ACUTE PAIN OF BOTH KNEES: ICD-10-CM

## 2022-06-24 DIAGNOSIS — M25.562 ACUTE PAIN OF BOTH KNEES: ICD-10-CM

## 2022-06-24 PROCEDURE — 97110 THERAPEUTIC EXERCISES: CPT

## 2022-06-24 NOTE — THERAPY TREATMENT NOTE
Outpatient Physical Therapy Ortho Treatment Note  [facility]     Patient Name: Meghann Stewart  : 1985  MRN: [id number]  Today's Date: 2022      Visit Date: 2022    Visit Dx:    ICD-10-CM ICD-9-CM   1. Quadriceps tendonitis  M76.899 727.09   2. Acute pain of both knees  M25.561 338.19    M25.562 719.46   3. Decreased range of motion of both knees  M25.661 719.56    M25.662    4. Muscle weakness of lower extremity  M62.81 728.87   5. Gait difficulty  R26.9 781.2       There is no problem list on file for this patient.       Past Medical History:   Diagnosis Date   • Diabetes mellitus (HCC)    • Migraines    • PONV (postoperative nausea and vomiting)         Past Surgical History:   Procedure Laterality Date   • ADENOIDECTOMY N/A 2021    Procedure: ADENOIDECTOMY;  Surgeon: Sebastian Bustos MD;  [place] [facility] [place];  Service: ENT;  Laterality: N/A;   • CHOLECYSTECTOMY     • HYSTERECTOMY     • TONSILLECTOMY N/A 2021    Procedure: TONSILLECTOMY;  Surgeon: Sebastian Bustos MD;  [place] [facility] [place];  Service: ENT;  Laterality: N/A;                        PT Assessment/Plan     Row Name 22 0900          PT Assessment    Assessment Comments Ms. Stewart returns for first follow up session reporting small improvement in pain, L knee is hurting more than R this date. She did her HEP and used ice for pain control with overall positive response. Reviewed HEP and progressed therex to include resistance during hip AB and LAQ, as well as standing activities (STS and heel toe raise).Updated HEP and reviewed with pt who remains appropriae for skilled PT.  -RS            PT Plan    PT Plan Comments Progress standing activities as tolerance allows consider side steps, gait training, marching  -RS           User Key  (r) = Recorded By, (t) = Taken By, (c) = Cosigned By    Initials Name Provider Type    RS Juliana Clement, PT Physical Therapist                   OP  Exercises     Row Name 06/24/22 0800             Subjective Comments    Subjective Comments The pain remains however it is some better overall with the exercises  -RS              Subjective Pain    Able to rate subjective pain? yes  -RS      Pre-Treatment Pain Level 5  -RS      Subjective Pain Comment Pt arrival time 0854 appt time 0845  -RS              Total Minutes    21980 - PT Therapeutic Exercise Minutes 38  -RS              Exercise 1    Exercise Name 1 nustep  -RS      Time 1 5 min  -RS              Exercise 2    Exercise Name 2 TA in HL  -RS      Cueing 2 Verbal;Tactile;Demo  -RS      Reps 2 10  -RS      Time 2 3 sec  -RS      Additional Comments perform TA with the other exercises  -RS              Exercise 3    Exercise Name 3 STS lower slowly  -RS      Cueing 3 Verbal;Demo  -RS      Reps 3 10  -RS              Exercise 4    Exercise Name 4 supine QS w/towel roll under knee  -RS      Cueing 4 Verbal;Tactile  -RS      Reps 4 10  -RS      Time 4 3sec  -RS              Exercise 5    Exercise Name 5 HL hip AD with small bridge  -RS      Cueing 5 Verbal;Tactile  -RS      Reps 5 15  -RS      Time 5 5s  -RS      Additional Comments ball  -RS              Exercise 6    Exercise Name 6 HL clamshell  -RS      Cueing 6 Verbal;Demo  -RS      Reps 6 20  -RS      Time 6 GTB  -RS              Exercise 7    Exercise Name 7 SLR w/ER  -RS      Cueing 7 Verbal;Tactile  -RS      Reps 7 15  -RS              Exercise 8    Exercise Name 8 heel/toe raise  -RS      Cueing 8 Verbal;Demo  -RS      Reps 8 20  -RS              Exercise 9    Exercise Name 9 LAQ  -RS      Cueing 9 Verbal;Demo  -RS      Sets 9 2  -RS      Reps 9 10  -RS      Additional Comments 2#  -RS              Exercise 10    Exercise Name 10 --  -RS      Cueing 10 --  -RS      Reps 10 --  -RS      Time 10 --  -RS            User Key  (r) = Recorded By, (t) = Taken By, (c) = Cosigned By    Initials Name Provider Type    RS Juliana Clement PT Physical Therapist                               PT OP Goals     Row Name 06/24/22 0800          PT Short Term Goals    STG Date to Achieve 07/21/22  -RS     STG 1 Patient will be independent with initial HEP.  -RS     STG 1 Progress Ongoing  -RS     STG 2 Pt will report decreased pain to 5/10 or less with gait and ADLs.  -RS     STG 2 Progress Ongoing;Progressing  -RS     STG 3 Pt will demonstrate increased AROM brooks ext to 0, AROM L flexion 125 degrees or better.  -RS     STG 3 Progress Ongoing  -RS            Long Term Goals    LTG Date to Achieve 08/20/22  -RS     LTG 1 Pt will be independent with advanced HEP for core and LE strengthening to facilitate ease of ADLs and gait.  -RS     LTG 1 Progress Ongoing  -RS     LTG 2 Pt will demonstrate increased strength to 4/5-4+/5 or better  -RS     LTG 2 Progress Ongoing  -RS     LTG 3 Pt will score 40/80 or better indicating decrease in perceived functional disability.  -RS     LTG 3 Progress Ongoing  -RS     LTG 4 Pt will report decreased pain to 3/10 or less with ADLs and gait to facilitate return to PLOF.  -RS     LTG 4 Progress Ongoing  -RS           User Key  (r) = Recorded By, (t) = Taken By, (c) = Cosigned By    Initials Name Provider Type    RS Juliana Clement, RADHA Physical Therapist                Therapy Education  Given: HEP  Program: Reinforced, Progressed, Modified  How Provided: Verbal, Demonstration, Written  Provided to: Patient  Level of Understanding: Demonstrated, Verbalized              Time Calculation:   Start Time: 0854  Stop Time: 0932  Time Calculation (min): 38 min  Timed Charges  59640 - PT Therapeutic Exercise Minutes: 38  Total Minutes  Timed Charges Total Minutes: 38   Total Minutes: 38  Therapy Charges for Today     Code Description Service Date Service Provider Modifiers Qty    07800333161 HC PT THER PROC EA 15 MIN 6/24/2022 Juliana Clement, PT GP 3                    Juliana Clement PT  6/24/2022

## 2022-06-30 ENCOUNTER — HOSPITAL ENCOUNTER (OUTPATIENT)
Dept: PHYSICAL THERAPY | Facility: HOSPITAL | Age: 37
Setting detail: THERAPIES SERIES
Discharge: HOME | End: 2022-06-30

## 2022-06-30 DIAGNOSIS — M25.662 DECREASED RANGE OF MOTION OF BOTH KNEES: ICD-10-CM

## 2022-06-30 DIAGNOSIS — M76.899 QUADRICEPS TENDONITIS: Primary | ICD-10-CM

## 2022-06-30 DIAGNOSIS — M62.81 MUSCLE WEAKNESS OF LOWER EXTREMITY: ICD-10-CM

## 2022-06-30 DIAGNOSIS — M25.562 ACUTE PAIN OF BOTH KNEES: ICD-10-CM

## 2022-06-30 DIAGNOSIS — M25.561 ACUTE PAIN OF BOTH KNEES: ICD-10-CM

## 2022-06-30 DIAGNOSIS — M25.661 DECREASED RANGE OF MOTION OF BOTH KNEES: ICD-10-CM

## 2022-06-30 DIAGNOSIS — R26.9 GAIT DIFFICULTY: ICD-10-CM

## 2022-06-30 PROCEDURE — 97110 THERAPEUTIC EXERCISES: CPT

## 2022-06-30 PROCEDURE — 97530 THERAPEUTIC ACTIVITIES: CPT

## 2022-07-11 ENCOUNTER — HOSPITAL ENCOUNTER (OUTPATIENT)
Dept: PHYSICAL THERAPY | Facility: HOSPITAL | Age: 37
Setting detail: THERAPIES SERIES
Discharge: HOME | End: 2022-07-11

## 2022-07-11 DIAGNOSIS — R26.9 GAIT DIFFICULTY: ICD-10-CM

## 2022-07-11 DIAGNOSIS — M25.662 DECREASED RANGE OF MOTION OF BOTH KNEES: ICD-10-CM

## 2022-07-11 DIAGNOSIS — M76.899 QUADRICEPS TENDONITIS: Primary | ICD-10-CM

## 2022-07-11 DIAGNOSIS — M25.661 DECREASED RANGE OF MOTION OF BOTH KNEES: ICD-10-CM

## 2022-07-11 DIAGNOSIS — M25.561 ACUTE PAIN OF BOTH KNEES: ICD-10-CM

## 2022-07-11 DIAGNOSIS — M25.562 ACUTE PAIN OF BOTH KNEES: ICD-10-CM

## 2022-07-11 DIAGNOSIS — M62.81 MUSCLE WEAKNESS OF LOWER EXTREMITY: ICD-10-CM

## 2022-07-11 PROCEDURE — 97110 THERAPEUTIC EXERCISES: CPT

## 2022-07-12 NOTE — THERAPY TREATMENT NOTE
Outpatient Physical Therapy Ortho Treatment Note  [facility]     Patient Name: Meghann Stewart  : 1985  MRN: [id number]  Today's Date: 2022      Visit Date: 2022    Visit Dx:    ICD-10-CM ICD-9-CM   1. Quadriceps tendonitis  M76.899 727.09   2. Acute pain of both knees  M25.561 338.19    M25.562 719.46   3. Decreased range of motion of both knees  M25.661 719.56    M25.662    4. Muscle weakness of lower extremity  M62.81 728.87   5. Gait difficulty  R26.9 781.2       There is no problem list on file for this patient.       Past Medical History:   Diagnosis Date   • Diabetes mellitus (HCC)    • Migraines    • PONV (postoperative nausea and vomiting)         Past Surgical History:   Procedure Laterality Date   • ADENOIDECTOMY N/A 2021    Procedure: ADENOIDECTOMY;  Surgeon: Sebastian Bustos MD;  Location: [facility] [place] MAIN OR;  Service: ENT;  Laterality: N/A;   • CHOLECYSTECTOMY     • HYSTERECTOMY     • TONSILLECTOMY N/A 2021    Procedure: TONSILLECTOMY;  Surgeon: Sebastian Bustos MD;  Location: [facility] [place] MAIN OR;  Service: ENT;  Laterality: N/A;                        PT Assessment/Plan     Row Name 22 1400          PT Assessment    Assessment Comments Meghann presented to therapy today with c/o increased pain brooks knees since her cortisone injections last week.  She ambulated with decreased stride, decreased heel strike and slow yemi.  We modified her ther ex today due to pain and focused on quad recruitment and hamstring stretching as well as resisted knee flex and extension and manual techniques that decreased her pain by 10-20% by the end of her session.  She was able to ambulate with improved heel strike to toe off as she was leaving. She will benefit from continuing skilled therapy services.  -JA            PT Plan    PT Plan Comments assess pain level, cont to work on good quad control, if ready resume previous ther ex with mix of standing and sitting or supine to  prevent continued build up of strain in weight bearing  -JA           User Key  (r) = Recorded By, (t) = Taken By, (c) = Cosigned By    Initials Name Provider Type    Pooja Haywood, PT Physical Therapist                   OP Exercises     Row Name 07/11/22 1400             Subjective Comments    Subjective Comments I got the knee injections last week, that's why I had to cancel my appt.  I have been more painful ever since.  Arrived at 2:27 for 2:15 appt  -JA              Subjective Pain    Able to rate subjective pain? yes  -JA      Pre-Treatment Pain Level 6  -JA      Subjective Pain Comment rates L knee 5, R 6  -JA              Total Minutes    19759 - PT Therapeutic Exercise Minutes 30  -JA              Exercise 1    Exercise Name 1 nustep  -JA      Time 1 5 min  -JA      Additional Comments L4, seat 6, UE 7  -JA              Exercise 2    Exercise Name 2 gait training  -JA      Cueing 2 Verbal  -JA      Time 2 5 min at barre  -JA              Exercise 3    Exercise Name 3 STS lower slowly  -JA      Cueing 3 Verbal;Demo  -JA      Reps 3 held due to pain in WB  -JA              Exercise 4    Exercise Name 4 supine QS w/towel roll under knee  -JA      Cueing 4 Verbal;Tactile  -JA      Reps 4 10ea  -JA      Time 4 3sec  -JA              Exercise 5    Exercise Name 5 HL hip AD with small bridge  -JA      Cueing 5 Verbal;Tactile  -JA      Reps 5 15  -JA      Time 5 5s  -JA      Additional Comments ball  -JA              Exercise 6    Exercise Name 6 supine hip abd w/plastic  -JA      Cueing 6 Verbal  -JA      Reps 6 15 ea  -JA      Time 6 used tband to lift LE slightly to reduce friction for R leg so pt could perform with much less pain  -JA              Exercise 7    Exercise Name 7 SLR w/ER  -JA      Cueing 7 Verbal;Tactile  -JA      Reps 7 15  -JA              Exercise 8    Exercise Name 8 heel/toe raise  -JA      Cueing 8 Verbal;Demo  -JA      Reps 8 held due to pain in WB  -JA              Exercise 9     Exercise Name 9 LAQ  -JA      Cueing 9 Verbal;Demo  -JA      Sets 9 --  -JA      Reps 9 10 ea  -JA              Exercise 10    Exercise Name 10 lateral stepping  -JA      Cueing 10 Verbal;Demo  -JA      Reps 10 held due to pain in WB  -JA              Exercise 11    Exercise Name 11 monster walk  -JA      Cueing 11 Verbal;Demo  -JA      Sets 11 held due to pain in WB  -JA              Exercise 12    Exercise Name 12 Marching  -JA      Cueing 12 Verbal;Demo  -JA      Reps 12 --  -JA      Time 12 held due to pain in WB  -JA              Exercise 13    Exercise Name 13 Supine hip ABD  -JA      Cueing 13 Verbal;Tactile  -JA      Reps 13 10 ea  -JA      Time 13 R needed assist of TBand to lift leg slightly up to reduce friction  -JA              Exercise 14    Exercise Name 14 manual techniques: patellar mobs, brief STM ham muscle/tendon, retrograde mass  -JA      Time 14 6 min  -JA              Exercise 15    Exercise Name 15 seated ham curl with light resistance  -JA      Cueing 15 Verbal;Tactile  -JA      Reps 15 15 ea  -JA      Additional Comments due to pain used tband assisted pt into extension however she was able to move into flexion  -            User Key  (r) = Recorded By, (t) = Taken By, (c) = Cosigned By    Initials Name Provider Type    Pooja Haywood, PT Physical Therapist                                                Time Calculation:   Start Time: 1427  Stop Time: 1459  Time Calculation (min): 32 min  Timed Charges  81154 - PT Therapeutic Exercise Minutes: 30  Total Minutes  Timed Charges Total Minutes: 30   Total Minutes: 30  Therapy Charges for Today     Code Description Service Date Service Provider Modifiers Qty    96995855240 HC PT THER PROC EA 15 MIN 7/11/2022 Pooja Rodriguez, PT GP 2                    Pooja Rodriguez PT  7/12/2022

## 2022-07-20 ENCOUNTER — HOSPITAL ENCOUNTER (OUTPATIENT)
Dept: PHYSICAL THERAPY | Facility: HOSPITAL | Age: 37
Setting detail: THERAPIES SERIES
Discharge: HOME | End: 2022-07-20

## 2022-07-20 DIAGNOSIS — M25.662 DECREASED RANGE OF MOTION OF BOTH KNEES: ICD-10-CM

## 2022-07-20 DIAGNOSIS — M25.661 DECREASED RANGE OF MOTION OF BOTH KNEES: ICD-10-CM

## 2022-07-20 DIAGNOSIS — M25.561 ACUTE PAIN OF BOTH KNEES: ICD-10-CM

## 2022-07-20 DIAGNOSIS — M25.562 ACUTE PAIN OF BOTH KNEES: ICD-10-CM

## 2022-07-20 DIAGNOSIS — R26.9 GAIT DIFFICULTY: ICD-10-CM

## 2022-07-20 DIAGNOSIS — M76.899 QUADRICEPS TENDONITIS: Primary | ICD-10-CM

## 2022-07-20 DIAGNOSIS — M62.81 MUSCLE WEAKNESS OF LOWER EXTREMITY: ICD-10-CM

## 2022-07-20 PROCEDURE — 97110 THERAPEUTIC EXERCISES: CPT

## 2022-07-20 NOTE — THERAPY PROGRESS REPORT/RE-CERT
Outpatient Physical Therapy Ortho Progress Note  [facility]     Patient Name: Meghann Stewart  : 1985  MRN: [id number]  Today's Date: 2022      Visit Date: 2022    Visit Dx:    ICD-10-CM ICD-9-CM   1. Quadriceps tendonitis  M76.899 727.09   2. Decreased range of motion of both knees  M25.661 719.56    M25.662    3. Acute pain of both knees  M25.561 338.19    M25.562 719.46   4. Muscle weakness of lower extremity  M62.81 728.87   5. Gait difficulty  R26.9 781.2       There is no problem list on file for this patient.       Past Medical History:   Diagnosis Date   • Diabetes mellitus (HCC)    • Migraines    • PONV (postoperative nausea and vomiting)         Past Surgical History:   Procedure Laterality Date   • ADENOIDECTOMY N/A 2021    Procedure: ADENOIDECTOMY;  Surgeon: Sebastian Bustos MD;  [place] [facility] [place];  Service: ENT;  Laterality: N/A;   • CHOLECYSTECTOMY     • HYSTERECTOMY     • TONSILLECTOMY N/A 2021    Procedure: TONSILLECTOMY;  Surgeon: Sebastian Bustos MD;  [place] [facility] [place];  Service: ENT;  Laterality: N/A;        PT Ortho     Row Name 22 1000       Right Lower Ext    Rt Knee Extension/Flexion AROM 0-122  -RS       Left Lower Ext    Lt Knee Extension/Flexion AROM 0-124  -RS       MMT Right Lower Ext    Rt Hip Flexion MMT, Gross Movement (4+/5) good plus  -RS    Rt Hip Extension MMT, Gross Movement (4/5) good  -RS    Rt Hip ABduction MMT, Gross Movement (4/5) good  -RS    Rt Knee Extension MMT, Gross Movement (4+/5) good plus  -RS    Rt Knee Flexion MMT, Gross Movement (4/5) good  -RS       MMT Left Lower Ext    Lt Hip Flexion MMT, Gross Movement (4+/5) good plus  -RS    Lt Hip Extension MMT, Gross Movement (4/5) good  -RS    Lt Hip ABduction MMT, Gross Movement (4/5) good  -RS    Lt Knee Extension MMT, Gross Movement (4/5) good  -RS    Lt Knee Flexion MMT, Gross Movement (4/5) good  -RS          User Key  (r) = Recorded By, (t) = Taken By,  (c) = Cosigned By    Initials Name Provider Type    Juliana Ramirez, PT Physical Therapist                             PT Assessment/Plan     Row Name 07/20/22 1000          PT Assessment    Functional Limitations Impaired gait;Limitation in home management;Limitations in community activities;Limitations in functional capacity and performance;Performance in leisure activities;Performance in self-care ADL;Performance in work activities  -RS     Impairments Pain;Muscle strength;Range of motion;Gait;Endurance;Posture;Poor body mechanics  -RS     Assessment Comments Ms. Stewart has been seen by PT for 5 sessions focused on B knee pain. Treatment has included therex, theract, HEP, gait training, pt education. Progress toward goals is good as she has met 3/3 STG and met or partially met 1/4 LTG. She reports 50% improvement in pain and function overall compared to start of PT. She demonstrates improvement in gait pattern, Le strength, and L knee flexion AROM compared to initial eval. LEFS score improved from 23/80 to 33/80.  Added step ups to therex with good tolerance and updated HEP appropriately. She continues to be limited in tolerance for walking and standing required for work performance, LE strength, and in pain and remains appropriate for skilled PT.  -RS            PT Plan    PT Plan Comments Continue to ocus on standing activities, consider mini squats  -RS           User Key  (r) = Recorded By, (t) = Taken By, (c) = Cosigned By    Initials Name Provider Type    Juliana Ramirez PT Physical Therapist                   OP Exercises     Row Name 07/20/22 0900             Subjective Comments    Subjective Comments Last night was a problem for the left knee but today it is OK, it is a 4/10 which is the best it has been  -RS              Subjective Pain    Able to rate subjective pain? yes  -RS      Pre-Treatment Pain Level 4  -RS              Total Minutes    85358 - PT Therapeutic Exercise Minutes 38   -RS              Exercise 1    Exercise Name 1 rec bike  -RS      Time 1 5 min  -RS      Additional Comments seat 5 L2  -RS              Exercise 2    Exercise Name 2 walking in hallway  -RS      Cueing 2 Verbal  -RS      Reps 2 1.5 long laps  -RS      Time 2 cues for heel strike  -RS              Exercise 3    Exercise Name 3 STS mat  -RS      Cueing 3 Verbal;Demo  -RS      Sets 3 2  -RS      Reps 3 10  -RS      Time 3 lower slowly  -RS              Exercise 4    Exercise Name 4 LAQ  -RS      Cueing 4 Verbal;Demo  -RS      Sets 4 2  -RS      Reps 4 10  -RS      Time 4 3#  -RS              Exercise 5    Exercise Name 5 HS curl standing  -RS      Cueing 5 Verbal;Demo  -RS      Sets 5 2  -RS      Reps 5 10  -RS      Time 5 3#  -RS              Exercise 6    Exercise Name 6 side steps  -RS      Cueing 6 Verbal;Demo  -RS      Reps 6 3 laps  -RS      Time 6 RTB below knees  -RS              Exercise 7    Exercise Name 7 marching fwd/retro  -RS      Cueing 7 Verbal;Demo  -RS      Reps 7 3 laps  -RS      Time 7 pause for SLS  -RS              Exercise 8    Exercise Name 8 step up  -RS      Cueing 8 Verbal;Demo  -RS      Sets 8 2  -RS      Reps 8 10  -RS      Time 8 6 inch  -RS      Additional Comments power  -RS              Exercise 9    Exercise Name 9 --  -RS      Cueing 9 --  -RS      Reps 9 --  -RS              Exercise 10    Exercise Name 10 --  -RS      Cueing 10 --  -RS      Reps 10 --  -RS              Exercise 11    Exercise Name 11 --  -RS      Cueing 11 --  -RS      Sets 11 --  -RS              Exercise 12    Exercise Name 12 --  -RS      Cueing 12 --  -RS      Time 12 --  -RS              Exercise 13    Exercise Name 13 --  -RS      Cueing 13 --  -RS      Reps 13 --  -RS      Time 13 --  -RS              Exercise 14    Exercise Name 14 --  -RS      Time 14 --  -RS              Exercise 15    Exercise Name 15 --  -RS      Cueing 15 --  -RS      Reps 15 --  -RS            User Key  (r) = Recorded By, (t) = Taken  By, (c) = Cosigned By    Initials Name Provider Type    Juliana Ramirez PT Physical Therapist                              PT OP Goals     Row Name 07/20/22 0900          PT Short Term Goals    STG Date to Achieve 07/21/22  -RS     STG 1 Patient will be independent with initial HEP.  -RS     STG 1 Progress Met  -RS     STG 2 Pt will report decreased pain to 5/10 or less with gait and ADLs.  -RS     STG 2 Progress Met  -RS     STG 3 Pt will demonstrate increased AROM brooks ext to 0, AROM L flexion 125 degrees or better.  -RS     STG 3 Progress Met  -RS     STG 3 Progress Comments 0-124 L, 0-122 R  -RS            Long Term Goals    LTG Date to Achieve 08/20/22  -RS     LTG 1 Pt will be independent with advanced HEP for core and LE strengthening to facilitate ease of ADLs and gait.  -RS     LTG 1 Progress Ongoing  -RS     LTG 2 Pt will demonstrate increased strength to 4/5-4+/5 or better  -RS     LTG 2 Progress Ongoing;Progressing  -RS     LTG 2 Progress Comments see objective  -RS     LTG 3 Pt will score 40/80 or better indicating decrease in perceived functional disability.  -RS     LTG 3 Progress Ongoing;Progressing  -RS     LTG 3 Progress Comments 33/80  -RS     LTG 4 Pt will report decreased pain to 3/10 or less with ADLs and gait to facilitate return to PLOF.  -RS     LTG 4 Progress Partially Met  -RS     LTG 4 Progress Comments 4/10  -RS           User Key  (r) = Recorded By, (t) = Taken By, (c) = Cosigned By    Initials Name Provider Type    Juliana Ramirez PT Physical Therapist                Therapy Education  Education Details: HEP update, walking program 30 min x 3  Given: HEP, Symptoms/condition management  Program: Reinforced, Progressed  How Provided: Verbal, Demonstration, Written  Provided to: Patient  Level of Understanding: Verbalized, Demonstrated    Outcome Measure Options: Lower Extremity Functional Scale (LEFS)  Lower Extremity Functional Index  Any of your usual work, housework or  school activities: A little bit of difficulty  Your usual hobbies, recreational or sporting activities: Moderate difficulty  Getting into or out of the bath: A little bit of difficulty  Walking between rooms: A little bit of difficulty  Putting on your shoes or socks: Moderate difficulty  Squatting: Quite a bit of difficulty  Lifting an object, like a bag of groceries from the floor: Moderate difficulty  Performing light activities around your home: A little bit of difficulty  Performing heavy activities around your home: Quite a bit of difficulty  Getting into or out of a car: A little bit of difficulty  Walking 2 blocks: Quite a bit of difficulty  Walking a mile: Extreme difficulty or unable to perform activity  Going up or down 10 stairs (about 1 flight of stairs): Quite a bit of difficulty  Standing for 1 hour: Moderate difficulty  Sitting for 1 hour: A little bit of difficulty  Running on even ground: Extreme difficulty or unable to perform activity  Running on uneven ground: Extreme difficulty or unable to perform activity  Making sharp turns while running fast: Extreme difficulty or unable to perform activity  Hopping: Extreme difficulty or unable to perform activity  Rolling over in bed: A little bit of difficulty  Total: 33      Time Calculation:   Start Time: 0941  Stop Time: 1019  Time Calculation (min): 38 min  Timed Charges  84006 - PT Therapeutic Exercise Minutes: 38  Total Minutes  Timed Charges Total Minutes: 38   Total Minutes: 38  Therapy Charges for Today     Code Description Service Date Service Provider Modifiers Qty    [id number]  PT THER PROC EA 15 MIN 7/20/2022 Juliana Clement, PT GP 3          PT G-Codes  Outcome Measure Options: Lower Extremity Functional Scale (LEFS)  Total: 33         Juliana Clement PT  7/20/2022

## 2022-07-28 ENCOUNTER — HOSPITAL ENCOUNTER (OUTPATIENT)
Dept: PHYSICAL THERAPY | Facility: HOSPITAL | Age: 37
Setting detail: THERAPIES SERIES
Discharge: HOME | End: 2022-07-28

## 2022-07-28 DIAGNOSIS — M25.661 DECREASED RANGE OF MOTION OF BOTH KNEES: ICD-10-CM

## 2022-07-28 DIAGNOSIS — M76.899 QUADRICEPS TENDONITIS: Primary | ICD-10-CM

## 2022-07-28 DIAGNOSIS — R26.9 GAIT DIFFICULTY: ICD-10-CM

## 2022-07-28 DIAGNOSIS — M25.662 DECREASED RANGE OF MOTION OF BOTH KNEES: ICD-10-CM

## 2022-07-28 DIAGNOSIS — M25.561 ACUTE PAIN OF BOTH KNEES: ICD-10-CM

## 2022-07-28 DIAGNOSIS — M25.562 ACUTE PAIN OF BOTH KNEES: ICD-10-CM

## 2022-07-28 PROCEDURE — 97110 THERAPEUTIC EXERCISES: CPT

## 2022-07-28 PROCEDURE — 97112 NEUROMUSCULAR REEDUCATION: CPT

## 2022-07-28 NOTE — THERAPY DISCHARGE NOTE
Outpatient Physical Therapy Ortho Treatment Note/Discharge Summary  [facility]     Patient Name: Meghann Stewart  : 1985  MRN: [id number]  Today's Date: 2022      Visit Date: 2022    Visit Dx:    ICD-10-CM ICD-9-CM   1. Quadriceps tendonitis  M76.899 727.09   2. Decreased range of motion of both knees  M25.661 719.56    M25.662    3. Acute pain of both knees  M25.561 338.19    M25.562 719.46   4. Gait difficulty  R26.9 781.2       There is no problem list on file for this patient.       Past Medical History:   Diagnosis Date   • Diabetes mellitus (HCC)    • Migraines    • PONV (postoperative nausea and vomiting)         Past Surgical History:   Procedure Laterality Date   • ADENOIDECTOMY N/A 2021    Procedure: ADENOIDECTOMY;  Surgeon: Sebastian Bustos MD;  Location: [facility] MAIN OR;  Service: ENT;  Laterality: N/A;   • CHOLECYSTECTOMY     • HYSTERECTOMY     • TONSILLECTOMY N/A 2021    Procedure: TONSILLECTOMY;  Surgeon: Sebastian Bustos MD;  Location: [facility] MAIN OR;  Service: ENT;  Laterality: N/A;        PT Ortho     Row Name 22 1000       Right Lower Ext    Rt Knee Extension/Flexion AROM 0-125  -RS       Left Lower Ext    Lt Knee Extension/Flexion AROM 0-130  -RS       MMT Right Lower Ext    Rt Hip Flexion MMT, Gross Movement (4+/5) good plus  -RS    Rt Hip Extension MMT, Gross Movement (4+/5) good plus  -RS    Rt Hip ABduction MMT, Gross Movement (4/5) good  -RS    Rt Knee Extension MMT, Gross Movement (4+/5) good plus  -RS    Rt Knee Flexion MMT, Gross Movement (4+/5) good plus  -RS       MMT Left Lower Ext    Lt Hip Flexion MMT, Gross Movement (4+/5) good plus  -RS    Lt Hip Extension MMT, Gross Movement (4/5) good  -RS    Lt Hip ABduction MMT, Gross Movement (4/5) good  -RS    Lt Knee Extension MMT, Gross Movement (4+/5) good plus  -RS    Lt Knee Flexion MMT, Gross Movement (4+/5) good plus  -RS          User Key  (r) = Recorded By, (t) = Taken By, (c) =  Cosigned By    Initials Name Provider Type    RS Juliana Clement, PT Physical Therapist                             PT Assessment/Plan     Row Name 07/28/22 1000          PT Assessment    Assessment Comments Ms. Stewart has been seen by PT for 6 total sessions focused on B knee pain. She reports significant improvement in pain during work and walking with pain 1/10 this date, peak pain 4/10 in the past week. She demonstrates marked improvement in B knee ROM, strength, and gait pattern compared to initial eval.She had B cortisone injections 3 weeks prior, she had increased pain for approx 1 week that has since improved.  She has met 3/3 STG and 4/4 LTG, she reports return to 80% of PLOF. She is able to walk 90 min without significant increase in pain and reports good compliance with HEP. LEFS score improved from 23/80 to 58/80 this date (80/80=ull ability). Focused on standing challenges and review of HEP this date, updated HEP and discussed DC plan with pt who reports agreement and understanding.  -RS            PT Plan    PT Plan Comments DC to HEP  -RS           User Key  (r) = Recorded By, (t) = Taken By, (c) = Cosigned By    Initials Name Provider Type    RS Juliana Clement, PT Physical Therapist                     OP Exercises     Row Name 07/28/22 1000             Subjective Comments    Subjective Comments I am at a 1 today  -RS              Subjective Pain    Able to rate subjective pain? yes  -RS      Pre-Treatment Pain Level 1  -RS              Total Minutes    26036 - PT Therapeutic Exercise Minutes 30  -RS      10120 -  PT Neuromuscular Reeducation Minutes 8  -RS              Exercise 1    Exercise Name 1 rec bike  -RS      Time 1 5 min  -RS      Additional Comments seat 5 L2  -RS              Exercise 2    Exercise Name 2 SL bridge  -RS      Cueing 2 Verbal;Demo  -RS      Reps 2 10  -RS      Time 2 3  -RS              Exercise 3    Exercise Name 3 STS tapping hips  -RS      Cueing 3 Verbal;Demo   -RS      Sets 3 2  -RS      Reps 3 10  -RS      Time 3 lower slowly  -RS      Additional Comments GTB  -RS              Exercise 4    Exercise Name 4 LAQ  -RS      Cueing 4 Verbal;Demo  -RS      Sets 4 2  -RS      Reps 4 10  -RS      Time 4 3#  -RS              Exercise 5    Exercise Name 5 sl CS  -RS      Cueing 5 Verbal;Demo  -RS      Sets 5 --  -RS      Reps 5 15  -RS      Time 5 GTB  -RS              Exercise 6    Exercise Name 6 side steps  -RS      Cueing 6 Verbal;Demo  -RS      Reps 6 3 laps  -RS      Time 6 GTB below knees  -RS              Exercise 7    Exercise Name 7 SLS EC  -RS      Cueing 7 Verbal;Demo  -RS      Reps 7 2  -RS      Time 7 20  -RS              Exercise 8    Exercise Name 8 step up  -RS      Cueing 8 Verbal;Demo  -RS      Sets 8 2  -RS      Reps 8 10  -RS      Time 8 6 inch  -RS      Additional Comments power  -RS            User Key  (r) = Recorded By, (t) = Taken By, (c) = Cosigned By    Initials Name Provider Type    RS Juliana Clement, PT Physical Therapist                                PT OP Goals     Row Name 07/28/22 1000          PT Short Term Goals    STG Date to Achieve 07/21/22  -RS     STG 1 Patient will be independent with initial HEP.  -RS     STG 1 Progress Met  -RS     STG 2 Pt will report decreased pain to 5/10 or less with gait and ADLs.  -RS     STG 2 Progress Met  -RS     STG 3 Pt will demonstrate increased AROM brooks ext to 0, AROM L flexion 125 degrees or better.  -RS     STG 3 Progress Met  -RS            Long Term Goals    LTG Date to Achieve 08/20/22  -RS     LTG 1 Pt will be independent with advanced HEP for core and LE strengthening to facilitate ease of ADLs and gait.  -RS     LTG 1 Progress Met  -RS     LTG 2 Pt will demonstrate increased strength to 4/5-4+/5 or better  -RS     LTG 2 Progress Met  -RS     LTG 3 Pt will score 40/80 or better indicating decrease in perceived functional disability.  -RS     LTG 3 Progress Met  -RS     LTG 4 Pt will report  decreased pain to 3/10 or less with ADLs and gait to facilitate return to PLOF.  -RS     LTG 4 Progress Met  -RS     LTG 4 Progress Comments 1/10  -RS           User Key  (r) = Recorded By, (t) = Taken By, (c) = Cosigned By    Initials Name Provider Type    RS Juliana Clement, PT Physical Therapist                Therapy Education  Education Details: DC plan, walking program, cus for squat mechanics, HEP  Given: HEP  Program: Reinforced, Progressed, Modified  How Provided: Verbal, Demonstration, Written  Provided to: Patient  Level of Understanding: Verbalized, Demonstrated    Outcome Measure Options: Lower Extremity Functional Scale (LEFS)  Lower Extremity Functional Index  Any of your usual work, housework or school activities: A little bit of difficulty  Your usual hobbies, recreational or sporting activities: A little bit of difficulty  Getting into or out of the bath: A little bit of difficulty  Walking between rooms: No difficulty  Putting on your shoes or socks: A little bit of difficulty  Squatting: Moderate difficulty  Lifting an object, like a bag of groceries from the floor: A little bit of difficulty  Performing light activities around your home: No difficulty  Performing heavy activities around your home: Moderate difficulty  Getting into or out of a car: No difficulty  Walking 2 blocks: A little bit of difficulty  Walking a mile: Quite a bit of difficulty  Going up or down 10 stairs (about 1 flight of stairs): A little bit of difficulty  Standing for 1 hour: No difficulty  Sitting for 1 hour: No difficulty  Running on even ground: Moderate difficulty  Running on uneven ground: Moderate difficulty  Making sharp turns while running fast: Moderate difficulty  Hopping: Moderate difficulty  Rolling over in bed: No difficulty  Total: 58      Time Calculation:   Start Time: 1008  Stop Time: 1047  Time Calculation (min): 39 min  Timed Charges  91052 - PT Therapeutic Exercise Minutes: 30  72849 -  PT  Neuromuscular Reeducation Minutes: 8  Total Minutes  Timed Charges Total Minutes: 38   Total Minutes: 38  Therapy Charges for Today     Code Description Service Date Service Provider Modifiers Qty    24669833339 HC PT NEUROMUSC RE EDUCATION EA 15 MIN 7/28/2022 Juliana Clement, PT GP 1    62818226095 HC PT THER PROC EA 15 MIN 7/28/2022 Juliana Clement, PT GP 2          PT G-Codes  Outcome Measure Options: Lower Extremity Functional Scale (LEFS)  Total: 58     OP PT Discharge Summary  Date of Discharge: 07/28/22  Reason for Discharge: All goals achieved  Outcomes Achieved: Able to achieve all goals within established timeline  Discharge Destination: Home with home program  Discharge Instructions/Additional Comments: Ms. Stewart has been seen by PT for 6 total sessions focused on B knee pain. She reports significant improvement in pain during work and walking with pain 1/10 this date, peak pain 4/10 in the past week. She demonstrates marked improvement in B knee ROM, strength, and gait pattern compared to initial eval.She had B cortisone injections 3 weeks prior, she had increased pain for approx 1 week that has since improved.  She has met 3/3 STG and 4/4 LTG, she reports return to 80% of PLOF. She is able to walk 90 min without significant increase in pain and reports good compliance with HEP. LEFS score improved from 23/80 to 58/80 this date (80/80=ull ability). Focused on standing challenges and review of HEP this date, updated HEP and discussed DC plan with pt who reports agreement and understanding.      Juliana Clement, RADHA  7/28/2022

## (undated) DEVICE — SPNG DISSCT SECTO TONSL MD PK/5

## (undated) DEVICE — CATH URETH AP 10F

## (undated) DEVICE — INTENDED FOR TISSUE SEPARATION, AND OTHER PROCEDURES THAT REQUIRE A SHARP SURGICAL BLADE TO PUNCTURE OR CUT.: Brand: BARD-PARKER ® CARBON RIB-BACK BLADES

## (undated) DEVICE — KT ANTI FOG W/FLD AND SPNG

## (undated) DEVICE — PK ENT 46

## (undated) DEVICE — FLEX ADVANTAGE 1500CC: Brand: FLEX ADVANTAGE

## (undated) DEVICE — BLD TONG INDIV/WRP A/ 6IN STRL

## (undated) DEVICE — GOWN,AURORA,NOREINF,RAGLAN,XL,STERILE: Brand: MEDLINE

## (undated) DEVICE — COAGULATOR SXN FTSWTCH 10F6IN

## (undated) DEVICE — 9165 UNIVERSAL PATIENT PLATE: Brand: 3M™

## (undated) DEVICE — CODMAN® SURGICAL PATTIES 1" X 3" (2.54CM X 7.62CM): Brand: CODMAN®

## (undated) DEVICE — GLV SURG BIOGEL LTX PF 7 1/2

## (undated) DEVICE — VIAL FORMALIN CAP 10P 40ML

## (undated) DEVICE — SUT GUT CHRM 3/0 SH 27IN G122H